# Patient Record
Sex: MALE | Race: ASIAN | Employment: FULL TIME | URBAN - METROPOLITAN AREA
[De-identification: names, ages, dates, MRNs, and addresses within clinical notes are randomized per-mention and may not be internally consistent; named-entity substitution may affect disease eponyms.]

---

## 2017-08-07 ENCOUNTER — OFFICE VISIT (OUTPATIENT)
Dept: INTERNAL MEDICINE CLINIC | Facility: CLINIC | Age: 42
End: 2017-08-07

## 2017-08-07 VITALS
BODY MASS INDEX: 25.77 KG/M2 | SYSTOLIC BLOOD PRESSURE: 120 MMHG | HEART RATE: 82 BPM | TEMPERATURE: 98 F | DIASTOLIC BLOOD PRESSURE: 80 MMHG | WEIGHT: 174 LBS | HEIGHT: 69 IN | RESPIRATION RATE: 16 BRPM

## 2017-08-07 DIAGNOSIS — Z13.1 SCREENING FOR DIABETES MELLITUS: ICD-10-CM

## 2017-08-07 DIAGNOSIS — Z13.0 SCREENING FOR DEFICIENCY ANEMIA: ICD-10-CM

## 2017-08-07 DIAGNOSIS — Z13.220 SCREENING, LIPID: ICD-10-CM

## 2017-08-07 DIAGNOSIS — Z00.00 ANNUAL PHYSICAL EXAM: Primary | ICD-10-CM

## 2017-08-07 PROCEDURE — 90715 TDAP VACCINE 7 YRS/> IM: CPT | Performed by: FAMILY MEDICINE

## 2017-08-07 PROCEDURE — 99386 PREV VISIT NEW AGE 40-64: CPT | Performed by: FAMILY MEDICINE

## 2017-08-07 PROCEDURE — 90471 IMMUNIZATION ADMIN: CPT | Performed by: FAMILY MEDICINE

## 2017-08-07 NOTE — PATIENT INSTRUCTIONS
Prevention Guidelines, Men Ages 36 to 52  Screening tests and vaccines are an important part of managing your health. Health counseling is essential, too. Below are guidelines for these, for men ages 36 to 52.  Talk with your healthcare provider to make s Hepatitis A Men at increased risk for infection – talk with your healthcare provider 2 doses given at least 6 months apart   Hepatitis B Men at increased risk for infection – talk with your healthcare provider 3 doses over 6 months; second dose should be g © 1459-8384 [address], [address] [place]. All rights reserved. This information is not intended as a substitute for professional medical care. Always follow your healthcare professional's instructions.

## 2017-08-07 NOTE — PROGRESS NOTES
HPI:    Patient ID: Staurnino Maynard is a 35year old male. HPI Here for annual check-up. Patient notes rare headaches, occasional feeling of RUQ pain with no pattern to when and goes away on its own. Otherwise no complaints.  Swims for exercise 3-4 times per we Grandfather    • Hypertension Maternal Grandfather          Review of Systems   Constitutional: Negative for chills, fatigue and fever. HENT: Negative for hearing loss and sore throat. Eyes: Negative for pain and visual disturbance.    Respiratory: Neg (primary encounter diagnosis)  Screening, lipid  Screening for diabetes mellitus  Screening for deficiency anemia  1-4. Reviewed preventive health recommendations with patient. Encouraged regular exercise and healthy eating. Check labs. Tdap today.       Or

## 2017-08-10 ENCOUNTER — LAB ENCOUNTER (OUTPATIENT)
Dept: LAB | Age: 42
End: 2017-08-10
Attending: FAMILY MEDICINE
Payer: COMMERCIAL

## 2017-08-10 DIAGNOSIS — Z13.0 SCREENING FOR DEFICIENCY ANEMIA: ICD-10-CM

## 2017-08-10 DIAGNOSIS — Z13.220 SCREENING, LIPID: ICD-10-CM

## 2017-08-10 DIAGNOSIS — Z13.1 SCREENING FOR DIABETES MELLITUS: ICD-10-CM

## 2017-08-10 DIAGNOSIS — Z00.00 ANNUAL PHYSICAL EXAM: ICD-10-CM

## 2017-08-10 LAB
ALBUMIN SERPL-MCNC: 4.1 G/DL (ref 3.5–4.8)
ALP LIVER SERPL-CCNC: 57 U/L (ref 45–117)
ALT SERPL-CCNC: 28 U/L (ref 17–63)
AST SERPL-CCNC: 18 U/L (ref 15–41)
BASOPHILS # BLD AUTO: 0.04 X10(3) UL (ref 0–0.1)
BASOPHILS NFR BLD AUTO: 0.6 %
BILIRUB SERPL-MCNC: 1 MG/DL (ref 0.1–2)
BUN BLD-MCNC: 10 MG/DL (ref 8–20)
CALCIUM BLD-MCNC: 9.2 MG/DL (ref 8.3–10.3)
CHLORIDE: 106 MMOL/L (ref 101–111)
CHOLEST SMN-MCNC: 172 MG/DL (ref ?–200)
CO2: 31 MMOL/L (ref 22–32)
CREAT BLD-MCNC: 1.03 MG/DL (ref 0.7–1.3)
EOSINOPHIL # BLD AUTO: 0.15 X10(3) UL (ref 0–0.3)
EOSINOPHIL NFR BLD AUTO: 2.3 %
ERYTHROCYTE [DISTWIDTH] IN BLOOD BY AUTOMATED COUNT: 12.6 % (ref 11.5–16)
GLUCOSE BLD-MCNC: 96 MG/DL (ref 70–99)
HCT VFR BLD AUTO: 45.7 % (ref 37–53)
HDLC SERPL-MCNC: 61 MG/DL (ref 45–?)
HDLC SERPL: 2.82 {RATIO} (ref ?–4.97)
HGB BLD-MCNC: 15.5 G/DL (ref 13–17)
IMMATURE GRANULOCYTE COUNT: 0.03 X10(3) UL (ref 0–1)
IMMATURE GRANULOCYTE RATIO %: 0.5 %
LDLC SERPL CALC-MCNC: 89 MG/DL (ref ?–130)
LDLC SERPL-MCNC: 22 MG/DL (ref 5–40)
LYMPHOCYTES # BLD AUTO: 1.66 X10(3) UL (ref 0.9–4)
LYMPHOCYTES NFR BLD AUTO: 25 %
M PROTEIN MFR SERPL ELPH: 7.3 G/DL (ref 6.1–8.3)
MCH RBC QN AUTO: 30.9 PG (ref 27–33.2)
MCHC RBC AUTO-ENTMCNC: 33.9 G/DL (ref 31–37)
MCV RBC AUTO: 91 FL (ref 80–99)
MONOCYTES # BLD AUTO: 0.6 X10(3) UL (ref 0.1–0.6)
MONOCYTES NFR BLD AUTO: 9 %
NEUTROPHIL ABS PRELIM: 4.17 X10 (3) UL (ref 1.3–6.7)
NEUTROPHILS # BLD AUTO: 4.17 X10(3) UL (ref 1.3–6.7)
NEUTROPHILS NFR BLD AUTO: 62.6 %
NONHDLC SERPL-MCNC: 111 MG/DL (ref ?–130)
PLATELET # BLD AUTO: 211 10(3)UL (ref 150–450)
POTASSIUM SERPL-SCNC: 4.3 MMOL/L (ref 3.6–5.1)
RBC # BLD AUTO: 5.02 X10(6)UL (ref 4.3–5.7)
RED CELL DISTRIBUTION WIDTH-SD: 41.5 FL (ref 35.1–46.3)
SODIUM SERPL-SCNC: 142 MMOL/L (ref 136–144)
TRIGLYCERIDES: 111 MG/DL (ref ?–150)
WBC # BLD AUTO: 6.7 X10(3) UL (ref 4–13)

## 2017-08-10 PROCEDURE — 85025 COMPLETE CBC W/AUTO DIFF WBC: CPT | Performed by: FAMILY MEDICINE

## 2017-08-10 PROCEDURE — 80061 LIPID PANEL: CPT | Performed by: FAMILY MEDICINE

## 2017-08-10 PROCEDURE — 80053 COMPREHEN METABOLIC PANEL: CPT | Performed by: FAMILY MEDICINE

## 2018-09-28 ENCOUNTER — TELEPHONE (OUTPATIENT)
Dept: INTERNAL MEDICINE CLINIC | Facility: CLINIC | Age: 43
End: 2018-09-28

## 2018-09-28 DIAGNOSIS — Z13.0 SCREENING, ANEMIA, DEFICIENCY, IRON: ICD-10-CM

## 2018-09-28 DIAGNOSIS — Z13.228 SCREENING FOR METABOLIC DISORDER: ICD-10-CM

## 2018-09-28 DIAGNOSIS — Z00.00 ROUTINE GENERAL MEDICAL EXAMINATION AT A HEALTH CARE FACILITY: Primary | ICD-10-CM

## 2018-09-28 DIAGNOSIS — Z13.220 SCREENING, LIPID: ICD-10-CM

## 2018-10-02 DIAGNOSIS — Z00.00 ROUTINE GENERAL MEDICAL EXAMINATION AT A HEALTH CARE FACILITY: ICD-10-CM

## 2018-10-02 DIAGNOSIS — Z13.228 SCREENING FOR METABOLIC DISORDER: ICD-10-CM

## 2018-10-02 DIAGNOSIS — Z13.0 SCREENING, ANEMIA, DEFICIENCY, IRON: ICD-10-CM

## 2018-10-02 DIAGNOSIS — Z13.220 SCREENING, LIPID: ICD-10-CM

## 2018-10-02 PROCEDURE — 80053 COMPREHEN METABOLIC PANEL: CPT | Performed by: FAMILY MEDICINE

## 2018-10-02 PROCEDURE — 80061 LIPID PANEL: CPT | Performed by: FAMILY MEDICINE

## 2018-10-02 PROCEDURE — 36415 COLL VENOUS BLD VENIPUNCTURE: CPT | Performed by: FAMILY MEDICINE

## 2018-10-02 PROCEDURE — 85025 COMPLETE CBC W/AUTO DIFF WBC: CPT | Performed by: FAMILY MEDICINE

## 2018-10-05 ENCOUNTER — OFFICE VISIT (OUTPATIENT)
Dept: INTERNAL MEDICINE CLINIC | Facility: CLINIC | Age: 43
End: 2018-10-05
Payer: COMMERCIAL

## 2018-10-05 VITALS
TEMPERATURE: 98 F | WEIGHT: 175 LBS | BODY MASS INDEX: 25.92 KG/M2 | DIASTOLIC BLOOD PRESSURE: 82 MMHG | HEART RATE: 70 BPM | RESPIRATION RATE: 16 BRPM | HEIGHT: 69 IN | SYSTOLIC BLOOD PRESSURE: 120 MMHG

## 2018-10-05 DIAGNOSIS — Z00.00 ANNUAL PHYSICAL EXAM: Primary | ICD-10-CM

## 2018-10-05 DIAGNOSIS — G89.29 CHRONIC PAIN OF BOTH KNEES: ICD-10-CM

## 2018-10-05 DIAGNOSIS — M25.562 CHRONIC PAIN OF BOTH KNEES: ICD-10-CM

## 2018-10-05 DIAGNOSIS — M25.561 CHRONIC PAIN OF BOTH KNEES: ICD-10-CM

## 2018-10-05 PROCEDURE — 99396 PREV VISIT EST AGE 40-64: CPT | Performed by: FAMILY MEDICINE

## 2018-10-05 NOTE — PROGRESS NOTES
HPI:    Patient ID: Cindy Machuca is a 37year old male. HPI Here for annual check-up. Patient notes years of intermittent knee pain in both knees. Has pain mainly when he starts to be more physically active with swimming. No clear injury.  Hasn't done anythi Stress Concern: Not Asked        Weight Concern: Not Asked         Service: Not Asked        Blood Transfusions: Not Asked        Occupational Exposure: Not Asked        Hobby Hazards: Not Asked        Sleep Concern: Not Asked        Back Care: Physical Exam   Constitutional: He is oriented to person, place, and time. He appears well-developed and well-nourished. HENT:   Head: Normocephalic and atraumatic.    Right Ear: Tympanic membrane, external ear and ear canal normal.   Left Ear: Tympanic

## 2019-05-16 ENCOUNTER — OFFICE VISIT (OUTPATIENT)
Dept: INTERNAL MEDICINE CLINIC | Facility: CLINIC | Age: 44
End: 2019-05-16
Payer: COMMERCIAL

## 2019-05-16 VITALS
RESPIRATION RATE: 16 BRPM | TEMPERATURE: 98 F | HEIGHT: 69 IN | BODY MASS INDEX: 25.33 KG/M2 | WEIGHT: 171 LBS | HEART RATE: 60 BPM | SYSTOLIC BLOOD PRESSURE: 124 MMHG | OXYGEN SATURATION: 98 % | DIASTOLIC BLOOD PRESSURE: 82 MMHG

## 2019-05-16 DIAGNOSIS — K21.9 GASTROESOPHAGEAL REFLUX DISEASE, ESOPHAGITIS PRESENCE NOT SPECIFIED: Primary | ICD-10-CM

## 2019-05-16 PROCEDURE — 99214 OFFICE O/P EST MOD 30 MIN: CPT | Performed by: FAMILY MEDICINE

## 2019-05-16 NOTE — PROGRESS NOTES
HPI:    Patient ID: Trena Alaniz is a 37year old male. HPI Here with c/o a few months of pain in stomach, epigastric area when he eats spicy foods. Some burning and irritation in the back of his throat. Worse at night when he lays down.  Hasn't taken anythi reflux disease, esophagitis presence not specified  (primary encounter diagnosis)  Start PPI and take daily for one month. Avoid foods that trigger symptoms. H.pylori test prior to starting PPI. If symptoms persist after one month will send to GI.      Orde

## 2019-05-16 NOTE — PATIENT INSTRUCTIONS
Start taking Omeprazole 20mg daily. If your insurance doesn't cover this, you can buy it over the counter. Take for one month. Bring your stool sample to the \"Outpatient Laboratory\" at [facility].   We will call you with the stool test results.

## 2019-05-17 ENCOUNTER — APPOINTMENT (OUTPATIENT)
Dept: LAB | Age: 44
End: 2019-05-17
Attending: FAMILY MEDICINE
Payer: COMMERCIAL

## 2019-05-17 DIAGNOSIS — K21.9 GASTROESOPHAGEAL REFLUX DISEASE, ESOPHAGITIS PRESENCE NOT SPECIFIED: ICD-10-CM

## 2019-05-17 PROCEDURE — 87338 HPYLORI STOOL AG IA: CPT | Performed by: FAMILY MEDICINE

## 2019-10-04 ENCOUNTER — OFFICE VISIT (OUTPATIENT)
Dept: INTERNAL MEDICINE CLINIC | Facility: CLINIC | Age: 44
End: 2019-10-04
Payer: COMMERCIAL

## 2019-10-04 VITALS
HEIGHT: 69 IN | OXYGEN SATURATION: 96 % | WEIGHT: 171.13 LBS | HEART RATE: 60 BPM | BODY MASS INDEX: 25.35 KG/M2 | RESPIRATION RATE: 16 BRPM | TEMPERATURE: 98 F | SYSTOLIC BLOOD PRESSURE: 130 MMHG | DIASTOLIC BLOOD PRESSURE: 86 MMHG

## 2019-10-04 DIAGNOSIS — R68.89 THROAT CONGESTION: Primary | ICD-10-CM

## 2019-10-04 PROCEDURE — 99214 OFFICE O/P EST MOD 30 MIN: CPT | Performed by: FAMILY MEDICINE

## 2019-10-04 NOTE — PROGRESS NOTES
1. Does the person being vaccinated have a history of severe allergic reaction to a previous dose of any influenza vaccine? - Unsure - ok per AMS  2. Does this person being vaccinated have an allergy to eggs? - NO  3.  Does the patient being vaccinated have

## 2019-10-04 NOTE — PATIENT INSTRUCTIONS
Start Allegra and Flonase and use daily for 2 weeks. Initially take Mucinex for the first 3 days in addition to the above medications. If in 2 weeks, your symptoms are still present, then see the ENT doctor.

## 2019-10-04 NOTE — PROGRESS NOTES
HPI:    Patient ID: Kei Calvin is a 53year old male. HPI Here for f/u on increased mucus production.  Tried PPI and this helped with some \"stomach\" issues but patient continues to have increased phlegm in his throat that he has a difficult time clearing then see the ENT doctor.    Orders Placed This Encounter      Flulaval 6 months and older 0.5 ml Quad PF [id number]      Meds This Visit:  Requested Prescriptions      No prescriptions requested or ordered in this encounter       Imaging & Referrals:  ENT - [place]

## 2020-02-27 ENCOUNTER — OFFICE VISIT (OUTPATIENT)
Dept: INTERNAL MEDICINE CLINIC | Facility: CLINIC | Age: 45
End: 2020-02-27
Payer: COMMERCIAL

## 2020-02-27 VITALS
BODY MASS INDEX: 25.03 KG/M2 | RESPIRATION RATE: 16 BRPM | TEMPERATURE: 98 F | DIASTOLIC BLOOD PRESSURE: 80 MMHG | HEART RATE: 64 BPM | WEIGHT: 169 LBS | SYSTOLIC BLOOD PRESSURE: 130 MMHG | OXYGEN SATURATION: 99 % | HEIGHT: 69 IN

## 2020-02-27 DIAGNOSIS — R68.89 THROAT CONGESTION: ICD-10-CM

## 2020-02-27 DIAGNOSIS — H69.81 EUSTACHIAN TUBE DYSFUNCTION, RIGHT: ICD-10-CM

## 2020-02-27 DIAGNOSIS — J02.9 ACUTE PHARYNGITIS, UNSPECIFIED ETIOLOGY: Primary | ICD-10-CM

## 2020-02-27 LAB
CONTROL LINE PRESENT WITH A CLEAR BACKGROUND (YES/NO): YES YES/NO
STREP GRP A CUL-SCR: NEGATIVE

## 2020-02-27 PROCEDURE — 87880 STREP A ASSAY W/OPTIC: CPT | Performed by: NURSE PRACTITIONER

## 2020-02-27 PROCEDURE — 99213 OFFICE O/P EST LOW 20 MIN: CPT | Performed by: NURSE PRACTITIONER

## 2020-02-27 NOTE — PATIENT INSTRUCTIONS
Please start taking over-the-counter 24-hour Allegra (Fexofenadine) and over-the-counter Flonase as directed on the packaging. Use the Allegra consistently for at least 7-days. Use the Flonase for at least 14-days.   If symptoms improve, but are still pers throat symptoms. Ask your pharmacist if you have questions about which medicine to use:  · Ease pain with anesthetic sprays. Aspirin or an aspirin substitute also helps.  Remember, never give aspirin to anyone 25 or younger, or if you are already taking blo is called the eustachian tube. OME may also occur with nasal allergies or after a bacterial middle ear infection. The pain or discomfort may come and go. You may hear clicking or popping sounds when you chew or swallow.  You may feel that your balance is with your healthcare provider or as advised if you are not feeling better after 3 days.   When to seek medical advice  Call your healthcare provider right away if any of the following occur:  · Your ear pain gets worse or does not start to improve   · Fever having a pet, keep it out of your bedroom and off upholstered furniture. Pollen:  · When pollen counts are high, keep windows of your car and home closed. If possible, use an air conditioner instead. · Wear a filter mask when mowing or doing yard work.

## 2020-02-27 NOTE — PROGRESS NOTES
HPI:    Patient ID: Onel Cat is a 53year old male. Patient presents with c/o a sore throat on only the right side for the past 2-weeks. Reports symptom is persisting, but not worsening.   Associated symptoms include painful swallowing and intermittent Glucos-Chond-Hyal Ac-Ca Fructo (MOVE FREE JOINT HEALTH ADVANCE OR) Take by mouth. • ASPIRIN OR Take by mouth as needed. Allergies:No Known Allergies   PHYSICAL EXAM:   Physical Exam   Constitutional: Vital signs are normal. He is cooperative.  H provider or an ENT in 2-4 weeks if symptoms do not improve and/or worsen despite recommended treatment for further evaluation.      Meds This Visit:  Requested Prescriptions      No prescriptions requested or ordered in this encounter       Imaging & Referr

## 2021-04-01 DIAGNOSIS — Z23 NEED FOR VACCINATION: ICD-10-CM

## 2021-06-28 ENCOUNTER — TELEPHONE (OUTPATIENT)
Dept: INTERNAL MEDICINE CLINIC | Facility: CLINIC | Age: 46
End: 2021-06-28

## 2021-06-28 DIAGNOSIS — Z13.220 SCREENING FOR LIPID DISORDERS: ICD-10-CM

## 2021-06-28 DIAGNOSIS — Z13.0 SCREENING FOR BLOOD DISEASE: ICD-10-CM

## 2021-06-28 DIAGNOSIS — Z13.228 SCREENING FOR METABOLIC DISORDER: ICD-10-CM

## 2021-06-28 DIAGNOSIS — Z00.00 ANNUAL PHYSICAL EXAM: Primary | ICD-10-CM

## 2021-06-28 NOTE — TELEPHONE ENCOUNTER
Future Appointments   Date Time Provider Noemi Lyn   7/9/2021  2:30 PM Ilene Simmons, DO [facility]     Orders to [facility] Thrasher aware must fast no call back required

## 2021-07-02 ENCOUNTER — LAB ENCOUNTER (OUTPATIENT)
Dept: LAB | Age: 46
End: 2021-07-02
Attending: FAMILY MEDICINE
Payer: COMMERCIAL

## 2021-07-02 DIAGNOSIS — Z13.0 SCREENING FOR BLOOD DISEASE: ICD-10-CM

## 2021-07-02 DIAGNOSIS — Z13.228 SCREENING FOR METABOLIC DISORDER: ICD-10-CM

## 2021-07-02 DIAGNOSIS — Z13.220 SCREENING FOR LIPID DISORDERS: ICD-10-CM

## 2021-07-02 DIAGNOSIS — Z00.00 ANNUAL PHYSICAL EXAM: ICD-10-CM

## 2021-07-02 LAB
ALBUMIN SERPL-MCNC: 4.2 G/DL (ref 3.4–5)
ALBUMIN/GLOB SERPL: 1.4 {RATIO} (ref 1–2)
ALP LIVER SERPL-CCNC: 51 U/L
ALT SERPL-CCNC: 26 U/L
ANION GAP SERPL CALC-SCNC: 6 MMOL/L (ref 0–18)
AST SERPL-CCNC: 17 U/L (ref 15–37)
BASOPHILS # BLD AUTO: 0.06 X10(3) UL (ref 0–0.2)
BASOPHILS NFR BLD AUTO: 0.9 %
BILIRUB SERPL-MCNC: 1 MG/DL (ref 0.1–2)
BUN BLD-MCNC: 16 MG/DL (ref 7–18)
BUN/CREAT SERPL: 15.7 (ref 10–20)
CALCIUM BLD-MCNC: 9.6 MG/DL (ref 8.5–10.1)
CHLORIDE SERPL-SCNC: 107 MMOL/L (ref 98–112)
CHOLEST SMN-MCNC: 201 MG/DL (ref ?–200)
CO2 SERPL-SCNC: 29 MMOL/L (ref 21–32)
CREAT BLD-MCNC: 1.02 MG/DL
DEPRECATED RDW RBC AUTO: 44.1 FL (ref 35.1–46.3)
EOSINOPHIL # BLD AUTO: 0.12 X10(3) UL (ref 0–0.7)
EOSINOPHIL NFR BLD AUTO: 1.8 %
ERYTHROCYTE [DISTWIDTH] IN BLOOD BY AUTOMATED COUNT: 12.9 % (ref 11–15)
GLOBULIN PLAS-MCNC: 3 G/DL (ref 2.8–4.4)
GLUCOSE BLD-MCNC: 100 MG/DL (ref 70–99)
HCT VFR BLD AUTO: 48 %
HDLC SERPL-MCNC: 62 MG/DL (ref 40–59)
HGB BLD-MCNC: 15.8 G/DL
IMM GRANULOCYTES # BLD AUTO: 0.02 X10(3) UL (ref 0–1)
IMM GRANULOCYTES NFR BLD: 0.3 %
LDLC SERPL CALC-MCNC: 122 MG/DL (ref ?–100)
LYMPHOCYTES # BLD AUTO: 2.19 X10(3) UL (ref 1–4)
LYMPHOCYTES NFR BLD AUTO: 33.2 %
M PROTEIN MFR SERPL ELPH: 7.2 G/DL (ref 6.4–8.2)
MCH RBC QN AUTO: 30.8 PG (ref 26–34)
MCHC RBC AUTO-ENTMCNC: 32.9 G/DL (ref 31–37)
MCV RBC AUTO: 93.6 FL
MONOCYTES # BLD AUTO: 0.53 X10(3) UL (ref 0.1–1)
MONOCYTES NFR BLD AUTO: 8 %
NEUTROPHILS # BLD AUTO: 3.67 X10 (3) UL (ref 1.5–7.7)
NEUTROPHILS # BLD AUTO: 3.67 X10(3) UL (ref 1.5–7.7)
NEUTROPHILS NFR BLD AUTO: 55.8 %
NONHDLC SERPL-MCNC: 139 MG/DL (ref ?–130)
OSMOLALITY SERPL CALC.SUM OF ELEC: 295 MOSM/KG (ref 275–295)
PATIENT FASTING Y/N/NP: YES
PATIENT FASTING Y/N/NP: YES
PLATELET # BLD AUTO: 200 10(3)UL (ref 150–450)
POTASSIUM SERPL-SCNC: 4.5 MMOL/L (ref 3.5–5.1)
RBC # BLD AUTO: 5.13 X10(6)UL
SODIUM SERPL-SCNC: 142 MMOL/L (ref 136–145)
TRIGL SERPL-MCNC: 98 MG/DL (ref 30–149)
VLDLC SERPL CALC-MCNC: 17 MG/DL (ref 0–30)
WBC # BLD AUTO: 6.6 X10(3) UL (ref 4–11)

## 2021-07-02 PROCEDURE — 80053 COMPREHEN METABOLIC PANEL: CPT | Performed by: FAMILY MEDICINE

## 2021-07-02 PROCEDURE — 80061 LIPID PANEL: CPT | Performed by: FAMILY MEDICINE

## 2021-07-02 PROCEDURE — 85025 COMPLETE CBC W/AUTO DIFF WBC: CPT | Performed by: FAMILY MEDICINE

## 2021-07-09 ENCOUNTER — OFFICE VISIT (OUTPATIENT)
Dept: INTERNAL MEDICINE CLINIC | Facility: CLINIC | Age: 46
End: 2021-07-09
Payer: COMMERCIAL

## 2021-07-09 VITALS
OXYGEN SATURATION: 97 % | WEIGHT: 168.63 LBS | HEIGHT: 69 IN | TEMPERATURE: 99 F | BODY MASS INDEX: 24.98 KG/M2 | HEART RATE: 82 BPM | SYSTOLIC BLOOD PRESSURE: 132 MMHG | DIASTOLIC BLOOD PRESSURE: 70 MMHG

## 2021-07-09 DIAGNOSIS — Z00.00 ANNUAL PHYSICAL EXAM: Primary | ICD-10-CM

## 2021-07-09 DIAGNOSIS — Z12.11 SCREENING FOR MALIGNANT NEOPLASM OF COLON: ICD-10-CM

## 2021-07-09 PROCEDURE — 3075F SYST BP GE 130 - 139MM HG: CPT | Performed by: FAMILY MEDICINE

## 2021-07-09 PROCEDURE — 3008F BODY MASS INDEX DOCD: CPT | Performed by: FAMILY MEDICINE

## 2021-07-09 PROCEDURE — 99396 PREV VISIT EST AGE 40-64: CPT | Performed by: FAMILY MEDICINE

## 2021-07-09 PROCEDURE — 3078F DIAST BP <80 MM HG: CPT | Performed by: FAMILY MEDICINE

## 2021-07-09 NOTE — PROGRESS NOTES
HPI/Subjective:   Patient ID: Tam Cavazos is a 50year old male. HPI Here for annual check-up.        History/Other:    Past Medical History:   Diagnosis Date   • Antral erosion 11/2011    small antral erosions   • Anxiety    • Chest discomfort 8/2014   • E joint swelling. Skin: Negative for rash. Neurological: Negative for dizziness, weakness, light-headedness, numbness and headaches. Hematological: Negative for adenopathy. Does not bruise/bleed easily.    Psychiatric/Behavioral: Negative for dysphoric this encounter.       Meds This Visit:  Requested Prescriptions      No prescriptions requested or ordered in this encounter       Imaging & Referrals:  [facility] - INTERNAL

## 2021-07-09 NOTE — PATIENT INSTRUCTIONS
Call your insurance company and see if they will cover a \"screening colonoscopy\" at your age, and if so, then call the Gastroenterologist office to schedule.    Prevention Guidelines, Men Ages 36 to 52  Screening tests and vaccines are an important part risks and benefits of digital rectal exam (SAMPSON) and prostate-specific antigen (PSA) screening1 At routine exams   Syphilis Men at increased risk for infection – talk with your healthcare provider At routine exams   Tuberculosis Men at increased risk for in of a Td booster after age 25, then Td every 10 years   Counseling Who needs it How often   Diet and exercise Men who are overweight or obese When diagnosed, and then at routine exams   Sexually transmitted infection prevention Men at increased risk for inf

## 2021-11-29 ENCOUNTER — OFFICE VISIT (OUTPATIENT)
Dept: INTERNAL MEDICINE CLINIC | Facility: CLINIC | Age: 46
End: 2021-11-29
Payer: COMMERCIAL

## 2021-11-29 ENCOUNTER — LAB ENCOUNTER (OUTPATIENT)
Dept: LAB | Age: 46
End: 2021-11-29
Attending: FAMILY MEDICINE
Payer: COMMERCIAL

## 2021-11-29 VITALS
SYSTOLIC BLOOD PRESSURE: 122 MMHG | DIASTOLIC BLOOD PRESSURE: 68 MMHG | OXYGEN SATURATION: 97 % | HEART RATE: 74 BPM | HEIGHT: 69 IN | WEIGHT: 168.19 LBS | BODY MASS INDEX: 24.91 KG/M2

## 2021-11-29 DIAGNOSIS — Z12.11 SCREENING FOR MALIGNANT NEOPLASM OF COLON: ICD-10-CM

## 2021-11-29 DIAGNOSIS — R10.10 PAIN OF UPPER ABDOMEN: ICD-10-CM

## 2021-11-29 DIAGNOSIS — R12 HEARTBURN: Primary | ICD-10-CM

## 2021-11-29 DIAGNOSIS — R39.9 LOWER URINARY TRACT SYMPTOMS: ICD-10-CM

## 2021-11-29 PROCEDURE — 84153 ASSAY OF PSA TOTAL: CPT | Performed by: FAMILY MEDICINE

## 2021-11-29 PROCEDURE — 3074F SYST BP LT 130 MM HG: CPT | Performed by: FAMILY MEDICINE

## 2021-11-29 PROCEDURE — 90686 IIV4 VACC NO PRSV 0.5 ML IM: CPT | Performed by: FAMILY MEDICINE

## 2021-11-29 PROCEDURE — 99214 OFFICE O/P EST MOD 30 MIN: CPT | Performed by: FAMILY MEDICINE

## 2021-11-29 PROCEDURE — 90471 IMMUNIZATION ADMIN: CPT | Performed by: FAMILY MEDICINE

## 2021-11-29 PROCEDURE — 81001 URINALYSIS AUTO W/SCOPE: CPT | Performed by: FAMILY MEDICINE

## 2021-11-29 PROCEDURE — 3078F DIAST BP <80 MM HG: CPT | Performed by: FAMILY MEDICINE

## 2021-11-29 PROCEDURE — 3008F BODY MASS INDEX DOCD: CPT | Performed by: FAMILY MEDICINE

## 2021-11-29 NOTE — PROGRESS NOTES
Subjective:   Patient ID: Cookie Green is a 48year old male. HPI Here with c/o heartburn and upper abd pain/burning depending on what he eats. Has had in the past. Hasn't taken anything for this.    Also notes frequently getting up to urinate in the middle INFLUENZA VACCINE QUAD PRESERVATIVE FREE 0.5 ML

## 2021-12-02 DIAGNOSIS — R31.29 MICROSCOPIC HEMATURIA: Primary | ICD-10-CM

## 2021-12-06 ENCOUNTER — TELEPHONE (OUTPATIENT)
Dept: INTERNAL MEDICINE CLINIC | Facility: CLINIC | Age: 46
End: 2021-12-06

## 2022-01-26 PROBLEM — K21.9 GASTROESOPHAGEAL REFLUX DISEASE: Status: ACTIVE | Noted: 2022-01-26

## 2022-01-26 PROBLEM — Z12.11 COLON CANCER SCREENING: Status: ACTIVE | Noted: 2022-01-26

## 2022-01-26 PROBLEM — R10.13 DYSPEPSIA: Status: ACTIVE | Noted: 2022-01-26

## 2022-02-13 ENCOUNTER — LAB ENCOUNTER (OUTPATIENT)
Dept: LAB | Facility: HOSPITAL | Age: 47
End: 2022-02-13
Attending: INTERNAL MEDICINE
Payer: COMMERCIAL

## 2022-02-13 DIAGNOSIS — Z01.818 PRE-OP TESTING: ICD-10-CM

## 2022-02-14 LAB — SARS-COV-2 RNA RESP QL NAA+PROBE: NOT DETECTED

## 2022-02-16 PROBLEM — Z80.0 FAMILY HISTORY OF STOMACH CANCER: Status: ACTIVE | Noted: 2022-02-16

## 2022-02-16 PROBLEM — R10.13 ABDOMINAL PAIN, EPIGASTRIC: Status: ACTIVE | Noted: 2022-02-16

## 2022-02-16 PROBLEM — Z12.11 SPECIAL SCREENING FOR MALIGNANT NEOPLASM OF COLON: Status: ACTIVE | Noted: 2022-02-16

## 2022-09-27 ENCOUNTER — TELEPHONE (OUTPATIENT)
Dept: INTERNAL MEDICINE CLINIC | Facility: CLINIC | Age: 47
End: 2022-09-27

## 2022-09-27 DIAGNOSIS — Z00.00 ANNUAL PHYSICAL EXAM: Primary | ICD-10-CM

## 2022-09-27 DIAGNOSIS — Z13.220 SCREENING FOR LIPID DISORDERS: ICD-10-CM

## 2022-09-27 DIAGNOSIS — Z13.0 SCREENING FOR BLOOD DISEASE: ICD-10-CM

## 2022-09-27 DIAGNOSIS — Z13.228 SCREENING FOR METABOLIC DISORDER: ICD-10-CM

## 2022-09-27 NOTE — TELEPHONE ENCOUNTER
Future Appointments   Date Time Provider Noemi Lyn   10/7/2022 10:30 AM Brianna Hsieh, DO [facility]     Orders to     THE [facility]        aware must fast no call back required

## 2022-10-03 ENCOUNTER — LAB ENCOUNTER (OUTPATIENT)
Dept: LAB | Age: 47
End: 2022-10-03
Attending: FAMILY MEDICINE
Payer: COMMERCIAL

## 2022-10-03 DIAGNOSIS — Z13.220 SCREENING FOR LIPID DISORDERS: ICD-10-CM

## 2022-10-03 DIAGNOSIS — Z00.00 ANNUAL PHYSICAL EXAM: ICD-10-CM

## 2022-10-03 DIAGNOSIS — Z13.0 SCREENING FOR BLOOD DISEASE: ICD-10-CM

## 2022-10-03 DIAGNOSIS — Z13.228 SCREENING FOR METABOLIC DISORDER: ICD-10-CM

## 2022-10-03 LAB
ALBUMIN SERPL-MCNC: 4 G/DL (ref 3.4–5)
ALBUMIN/GLOB SERPL: 1.3 {RATIO} (ref 1–2)
ALP LIVER SERPL-CCNC: 47 U/L
ALT SERPL-CCNC: 30 U/L
ANION GAP SERPL CALC-SCNC: 5 MMOL/L (ref 0–18)
AST SERPL-CCNC: 17 U/L (ref 15–37)
BASOPHILS # BLD AUTO: 0.04 X10(3) UL (ref 0–0.2)
BASOPHILS NFR BLD AUTO: 0.6 %
BILIRUB SERPL-MCNC: 1 MG/DL (ref 0.1–2)
BUN BLD-MCNC: 13 MG/DL (ref 7–18)
BUN/CREAT SERPL: 12.7 (ref 10–20)
CALCIUM BLD-MCNC: 9.5 MG/DL (ref 8.5–10.1)
CHLORIDE SERPL-SCNC: 106 MMOL/L (ref 98–112)
CHOLEST SERPL-MCNC: 191 MG/DL (ref ?–200)
CO2 SERPL-SCNC: 30 MMOL/L (ref 21–32)
CREAT BLD-MCNC: 1.02 MG/DL
DEPRECATED RDW RBC AUTO: 42.5 FL (ref 35.1–46.3)
EOSINOPHIL # BLD AUTO: 0.15 X10(3) UL (ref 0–0.7)
EOSINOPHIL NFR BLD AUTO: 2.4 %
ERYTHROCYTE [DISTWIDTH] IN BLOOD BY AUTOMATED COUNT: 12.5 % (ref 11–15)
FASTING PATIENT LIPID ANSWER: YES
FASTING STATUS PATIENT QL REPORTED: YES
GFR SERPLBLD BASED ON 1.73 SQ M-ARVRAT: 90 ML/MIN/1.73M2 (ref 60–?)
GLOBULIN PLAS-MCNC: 3.2 G/DL (ref 2.8–4.4)
GLUCOSE BLD-MCNC: 102 MG/DL (ref 70–99)
HCT VFR BLD AUTO: 46.9 %
HDLC SERPL-MCNC: 64 MG/DL (ref 40–59)
HGB BLD-MCNC: 15.7 G/DL
IMM GRANULOCYTES # BLD AUTO: 0.01 X10(3) UL (ref 0–1)
IMM GRANULOCYTES NFR BLD: 0.2 %
LDLC SERPL CALC-MCNC: 107 MG/DL (ref ?–100)
LYMPHOCYTES # BLD AUTO: 2.3 X10(3) UL (ref 1–4)
LYMPHOCYTES NFR BLD AUTO: 36.5 %
MCH RBC QN AUTO: 30.8 PG (ref 26–34)
MCHC RBC AUTO-ENTMCNC: 33.5 G/DL (ref 31–37)
MCV RBC AUTO: 92 FL
MONOCYTES # BLD AUTO: 0.53 X10(3) UL (ref 0.1–1)
MONOCYTES NFR BLD AUTO: 8.4 %
NEUTROPHILS # BLD AUTO: 3.27 X10 (3) UL (ref 1.5–7.7)
NEUTROPHILS # BLD AUTO: 3.27 X10(3) UL (ref 1.5–7.7)
NEUTROPHILS NFR BLD AUTO: 51.9 %
NONHDLC SERPL-MCNC: 127 MG/DL (ref ?–130)
OSMOLALITY SERPL CALC.SUM OF ELEC: 292 MOSM/KG (ref 275–295)
PLATELET # BLD AUTO: 194 10(3)UL (ref 150–450)
POTASSIUM SERPL-SCNC: 4.1 MMOL/L (ref 3.5–5.1)
PROT SERPL-MCNC: 7.2 G/DL (ref 6.4–8.2)
RBC # BLD AUTO: 5.1 X10(6)UL
SODIUM SERPL-SCNC: 141 MMOL/L (ref 136–145)
TRIGL SERPL-MCNC: 114 MG/DL (ref 30–149)
VLDLC SERPL CALC-MCNC: 19 MG/DL (ref 0–30)
WBC # BLD AUTO: 6.3 X10(3) UL (ref 4–11)

## 2022-10-03 PROCEDURE — 80053 COMPREHEN METABOLIC PANEL: CPT | Performed by: FAMILY MEDICINE

## 2022-10-03 PROCEDURE — 80061 LIPID PANEL: CPT | Performed by: FAMILY MEDICINE

## 2022-10-03 PROCEDURE — 85025 COMPLETE CBC W/AUTO DIFF WBC: CPT | Performed by: FAMILY MEDICINE

## 2022-10-07 ENCOUNTER — TELEPHONE (OUTPATIENT)
Dept: INTERNAL MEDICINE CLINIC | Facility: CLINIC | Age: 47
End: 2022-10-07

## 2022-10-07 NOTE — TELEPHONE ENCOUNTER
Pt's cpe was cancelled as AMS was out today. Pt will call back to r/s when he checks his calendar. Pt got labs done.

## 2022-10-13 ENCOUNTER — OFFICE VISIT (OUTPATIENT)
Dept: INTERNAL MEDICINE CLINIC | Facility: CLINIC | Age: 47
End: 2022-10-13
Payer: COMMERCIAL

## 2022-10-13 VITALS
DIASTOLIC BLOOD PRESSURE: 74 MMHG | SYSTOLIC BLOOD PRESSURE: 130 MMHG | WEIGHT: 163 LBS | HEIGHT: 69 IN | OXYGEN SATURATION: 99 % | BODY MASS INDEX: 24.14 KG/M2 | HEART RATE: 93 BPM

## 2022-10-13 DIAGNOSIS — Z00.00 ANNUAL PHYSICAL EXAM: Primary | ICD-10-CM

## 2022-10-13 PROCEDURE — 99396 PREV VISIT EST AGE 40-64: CPT | Performed by: FAMILY MEDICINE

## 2022-10-13 PROCEDURE — 3078F DIAST BP <80 MM HG: CPT | Performed by: FAMILY MEDICINE

## 2022-10-13 PROCEDURE — 90471 IMMUNIZATION ADMIN: CPT | Performed by: FAMILY MEDICINE

## 2022-10-13 PROCEDURE — 3075F SYST BP GE 130 - 139MM HG: CPT | Performed by: FAMILY MEDICINE

## 2022-10-13 PROCEDURE — 3008F BODY MASS INDEX DOCD: CPT | Performed by: FAMILY MEDICINE

## 2022-10-13 PROCEDURE — 90686 IIV4 VACC NO PRSV 0.5 ML IM: CPT | Performed by: FAMILY MEDICINE

## 2023-10-27 ENCOUNTER — TELEPHONE (OUTPATIENT)
Dept: INTERNAL MEDICINE CLINIC | Facility: CLINIC | Age: 48
End: 2023-10-27

## 2023-10-27 NOTE — TELEPHONE ENCOUNTER
Patient is out of town and he will be back Sunday night. Would like an appt with [facility]. Patient was having some flu like symptoms for a few days; throat irritation and low grade fever 2 nights ago. Yesterday patient noticed some blistering around his waist but no pain that is too bad.     Please call patient's wife on HIPPA

## 2023-10-27 NOTE — TELEPHONE ENCOUNTER
Called and spoke to Robert (on HIPAA). Per Robert, pt started with flu-like sxs about 2 days ago (sore throat and low grade temp). Yesterday noticed some blisters on his hip. Asked her if this is on one side of body, but she was not sure. Pt currently in [place] for work and is in/out of meetings so she has not been able to talk to him much. She was also concerned about shingles. Discussed signs/sx of shingles and informed her typically on 1 side of body. If pt has sxs, should be seen in UC as need to start treatment within 72 hrs of sx onset. She said she believes blisters have been there longer than this. She would like to schedule appt with AMS for his other sxs when pt returns, but when she is able to speak to him, they will determine if UC is needed sooner. If he decides to go to UC, she will call to cancel appt. Pt has not taken covid test as they have not had any.      CHAPINCITO BERNABE

## 2024-10-30 ENCOUNTER — TELEPHONE (OUTPATIENT)
Dept: INTERNAL MEDICINE CLINIC | Facility: CLINIC | Age: 49
End: 2024-10-30

## 2024-10-30 DIAGNOSIS — Z13.0 SCREENING FOR BLOOD DISEASE: ICD-10-CM

## 2024-10-30 DIAGNOSIS — Z13.228 SCREENING FOR METABOLIC DISORDER: ICD-10-CM

## 2024-10-30 DIAGNOSIS — Z12.5 SCREENING FOR MALIGNANT NEOPLASM OF PROSTATE: ICD-10-CM

## 2024-10-30 DIAGNOSIS — Z00.00 ROUTINE GENERAL MEDICAL EXAMINATION AT A HEALTH CARE FACILITY: Primary | ICD-10-CM

## 2024-10-30 DIAGNOSIS — Z13.220 SCREENING FOR LIPID DISORDERS: ICD-10-CM

## 2024-10-30 DIAGNOSIS — Z13.29 SCREENING FOR THYROID DISORDER: ICD-10-CM

## 2024-10-30 NOTE — TELEPHONE ENCOUNTER
Patient called request labs prior to their annual physical.  Annual physical scheduled for 11/13/24.   Please order labs. Patient preferred lab is [facility].  Patient informed request was sent to clinical team.  Patient informed to fast for labs.  No callback required.

## 2024-11-04 ENCOUNTER — LAB ENCOUNTER (OUTPATIENT)
Dept: LAB | Age: 49
End: 2024-11-04
Attending: FAMILY MEDICINE
Payer: COMMERCIAL

## 2024-11-04 DIAGNOSIS — Z13.0 SCREENING FOR BLOOD DISEASE: ICD-10-CM

## 2024-11-04 DIAGNOSIS — Z13.228 SCREENING FOR METABOLIC DISORDER: ICD-10-CM

## 2024-11-04 DIAGNOSIS — Z00.00 ROUTINE GENERAL MEDICAL EXAMINATION AT A HEALTH CARE FACILITY: ICD-10-CM

## 2024-11-04 DIAGNOSIS — Z13.220 SCREENING FOR LIPID DISORDERS: ICD-10-CM

## 2024-11-04 DIAGNOSIS — Z12.5 SCREENING FOR MALIGNANT NEOPLASM OF PROSTATE: ICD-10-CM

## 2024-11-04 LAB
ALBUMIN SERPL-MCNC: 4.5 G/DL (ref 3.2–4.8)
ALBUMIN/GLOB SERPL: 1.7 {RATIO} (ref 1–2)
ALP LIVER SERPL-CCNC: 48 U/L
ALT SERPL-CCNC: 17 U/L
ANION GAP SERPL CALC-SCNC: 2 MMOL/L (ref 0–18)
AST SERPL-CCNC: 19 U/L (ref ?–34)
BASOPHILS # BLD AUTO: 0.03 X10(3) UL (ref 0–0.2)
BASOPHILS NFR BLD AUTO: 0.5 %
BILIRUB SERPL-MCNC: 0.9 MG/DL (ref 0.3–1.2)
BUN BLD-MCNC: 12 MG/DL (ref 9–23)
CALCIUM BLD-MCNC: 10.2 MG/DL (ref 8.7–10.4)
CHLORIDE SERPL-SCNC: 109 MMOL/L (ref 98–112)
CHOLEST SERPL-MCNC: 186 MG/DL (ref ?–200)
CO2 SERPL-SCNC: 30 MMOL/L (ref 21–32)
COMPLEXED PSA SERPL-MCNC: 0.66 NG/ML (ref ?–4)
CREAT BLD-MCNC: 1.01 MG/DL
EGFRCR SERPLBLD CKD-EPI 2021: 90 ML/MIN/1.73M2 (ref 60–?)
EOSINOPHIL # BLD AUTO: 0.2 X10(3) UL (ref 0–0.7)
EOSINOPHIL NFR BLD AUTO: 3.1 %
ERYTHROCYTE [DISTWIDTH] IN BLOOD BY AUTOMATED COUNT: 12.7 %
FASTING PATIENT LIPID ANSWER: YES
FASTING STATUS PATIENT QL REPORTED: YES
GLOBULIN PLAS-MCNC: 2.6 G/DL (ref 2–3.5)
GLUCOSE BLD-MCNC: 98 MG/DL (ref 70–99)
HCT VFR BLD AUTO: 45.6 %
HDLC SERPL-MCNC: 61 MG/DL (ref 40–59)
HGB BLD-MCNC: 15.5 G/DL
IMM GRANULOCYTES # BLD AUTO: 0.02 X10(3) UL (ref 0–1)
IMM GRANULOCYTES NFR BLD: 0.3 %
LDLC SERPL CALC-MCNC: 106 MG/DL (ref ?–100)
LYMPHOCYTES # BLD AUTO: 1.91 X10(3) UL (ref 1–4)
LYMPHOCYTES NFR BLD AUTO: 29.8 %
MCH RBC QN AUTO: 31.4 PG (ref 26–34)
MCHC RBC AUTO-ENTMCNC: 34 G/DL (ref 31–37)
MCV RBC AUTO: 92.3 FL
MONOCYTES # BLD AUTO: 0.5 X10(3) UL (ref 0.1–1)
MONOCYTES NFR BLD AUTO: 7.8 %
NEUTROPHILS # BLD AUTO: 3.74 X10 (3) UL (ref 1.5–7.7)
NEUTROPHILS # BLD AUTO: 3.74 X10(3) UL (ref 1.5–7.7)
NEUTROPHILS NFR BLD AUTO: 58.5 %
NONHDLC SERPL-MCNC: 125 MG/DL (ref ?–130)
OSMOLALITY SERPL CALC.SUM OF ELEC: 292 MOSM/KG (ref 275–295)
PLATELET # BLD AUTO: 197 10(3)UL (ref 150–450)
POTASSIUM SERPL-SCNC: 4.3 MMOL/L (ref 3.5–5.1)
PROT SERPL-MCNC: 7.1 G/DL (ref 5.7–8.2)
RBC # BLD AUTO: 4.94 X10(6)UL
SODIUM SERPL-SCNC: 141 MMOL/L (ref 136–145)
TRIGL SERPL-MCNC: 105 MG/DL (ref 30–149)
VLDLC SERPL CALC-MCNC: 18 MG/DL (ref 0–30)
WBC # BLD AUTO: 6.4 X10(3) UL (ref 4–11)

## 2024-11-04 PROCEDURE — 80061 LIPID PANEL: CPT | Performed by: FAMILY MEDICINE

## 2024-11-04 PROCEDURE — 85025 COMPLETE CBC W/AUTO DIFF WBC: CPT | Performed by: FAMILY MEDICINE

## 2024-11-04 PROCEDURE — 84153 ASSAY OF PSA TOTAL: CPT | Performed by: FAMILY MEDICINE

## 2024-11-04 PROCEDURE — 80053 COMPREHEN METABOLIC PANEL: CPT | Performed by: FAMILY MEDICINE

## 2024-11-13 ENCOUNTER — OFFICE VISIT (OUTPATIENT)
Dept: INTERNAL MEDICINE CLINIC | Facility: CLINIC | Age: 49
End: 2024-11-13
Payer: COMMERCIAL

## 2024-11-13 VITALS
DIASTOLIC BLOOD PRESSURE: 74 MMHG | WEIGHT: 158.31 LBS | SYSTOLIC BLOOD PRESSURE: 136 MMHG | HEART RATE: 76 BPM | TEMPERATURE: 97 F | RESPIRATION RATE: 18 BRPM | HEIGHT: 68.9 IN | OXYGEN SATURATION: 98 % | BODY MASS INDEX: 23.45 KG/M2

## 2024-11-13 DIAGNOSIS — Z00.00 ANNUAL PHYSICAL EXAM: Primary | ICD-10-CM

## 2024-11-13 DIAGNOSIS — B02.9 HERPES ZOSTER WITHOUT COMPLICATION: ICD-10-CM

## 2024-11-13 PROCEDURE — 3078F DIAST BP <80 MM HG: CPT | Performed by: FAMILY MEDICINE

## 2024-11-13 PROCEDURE — 3075F SYST BP GE 130 - 139MM HG: CPT | Performed by: FAMILY MEDICINE

## 2024-11-13 PROCEDURE — 99396 PREV VISIT EST AGE 40-64: CPT | Performed by: FAMILY MEDICINE

## 2024-11-13 PROCEDURE — 3008F BODY MASS INDEX DOCD: CPT | Performed by: FAMILY MEDICINE

## 2024-11-13 RX ORDER — VALACYCLOVIR HYDROCHLORIDE 1 G/1
1000 TABLET, FILM COATED ORAL 3 TIMES DAILY
Qty: 21 TABLET | Refills: 0 | Status: SHIPPED | OUTPATIENT
Start: 2024-11-13 | End: 2024-11-20

## 2024-11-13 NOTE — PROGRESS NOTES
Subjective:   Patient ID: Ashli Maria is a 49 year old male.    HPI Here for annual check-up. Patient is exercising on average once every couple of days.   Has had rash on lower abdomen on left side for one day. Feels similar to when he had shingles last year.     History/Other:   Past Medical History:    Abdominal pain    Antral erosion    small antral erosions    Anxiety    Atypical mole    Chest discomfort    Elevated LFTs    ETD (eustachian tube dysfunction)    Frequent urination    Heartburn    High blood pressure    Hypertrophy of inferior nasal turbinate    Irregular heart beat    \"irregular heart beats; heart premature beating 1993\" per E-Clinical    Liver lesion    nonspecific liver lesions    Malaise and fatigue    Nasal septal deviation    Stye    Wears glasses     Past Surgical History:   Procedure Laterality Date    Nasal surg proc unlisted  6/1/09    septoplasty, outfracture & laser reduction of inferior turbinates    Upper gi endoscopy,exam  12/1/2011    EGD w/ biopsies     Social History     Socioeconomic History    Marital status:    Tobacco Use    Smoking status: Never    Smokeless tobacco: Never   Vaping Use    Vaping status: Never Used   Substance and Sexual Activity    Alcohol use: Yes     Alcohol/week: 1.0 standard drink of alcohol     Types: 1 Cans of beer per week    Drug use: No   Other Topics Concern    Exercise Yes     Comment: swimming      Family History   Problem Relation Age of Onset    Heart Disorder Father         irregular heart beats    Heart Disease Father     Hypertension Mother     Diabetes Mother         Type 2    Lung Disorder Paternal Grandfather         lung disease    Cancer Maternal Grandmother         stomach    Stroke Maternal Grandfather     Hypertension Maternal Grandfather        Review of Systems   Constitutional:  Negative for chills, fatigue and fever.   HENT:  Negative for hearing loss and sore throat.    Eyes:  Negative for pain and visual disturbance.    Respiratory:  Negative for choking, shortness of breath and wheezing.    Cardiovascular:  Negative for chest pain, palpitations and leg swelling.   Gastrointestinal:  Negative for abdominal pain, constipation, diarrhea, nausea and vomiting.   Endocrine: Negative for polydipsia, polyphagia and polyuria.   Genitourinary:  Negative for dysuria.   Musculoskeletal:  Negative for arthralgias and joint swelling.   Skin:  Negative for rash.   Neurological:  Negative for dizziness, weakness, light-headedness, numbness and headaches.   Hematological:  Negative for adenopathy. Does not bruise/bleed easily.   Psychiatric/Behavioral:  Negative for dysphoric mood. The patient is not nervous/anxious.      Current Outpatient Medications   Medication Sig Dispense Refill    valACYclovir 1 G Oral Tab Take 1 tablet (1,000 mg total) by mouth in the morning, at noon, and at bedtime for 7 days. 21 tablet 0    Multiple Vitamins-Minerals (MULTIVITAMIN MEN OR) Take by mouth.      Glucos-Chond-Hyal Ac-Ca Fructo (MOVE FREE JOINT [facility] ADVANCE OR) Take by mouth.      ASPIRIN OR Take by mouth as needed.         Allergies:Allergies[1]    Objective:   Physical Exam  Vitals reviewed.   Constitutional:       Appearance: Normal appearance. He is well-developed.   HENT:      Head: Normocephalic and atraumatic.      Right Ear: Tympanic membrane, ear canal and external ear normal.      Left Ear: Tympanic membrane, ear canal and external ear normal.      Mouth/Throat:      Pharynx: No posterior oropharyngeal erythema.   Eyes:      Conjunctiva/sclera: Conjunctivae normal.   Cardiovascular:      Rate and Rhythm: Normal rate and regular rhythm.      Heart sounds: Normal heart sounds.   Pulmonary:      Effort: Pulmonary effort is normal.      Breath sounds: Normal breath sounds.   Musculoskeletal:      Cervical back: Normal range of motion and neck supple.   Skin:     General: Skin is warm and dry.             Comments: Few, scattered erythematous papules  in dermatomal distribution   Neurological:      Mental Status: He is alert and oriented to person, place, and time.   Psychiatric:         Behavior: Behavior normal.         Assessment & Plan:   1. Annual physical exam    2. Herpes zoster without complication    Reviewed age-appropriate preventive health and safety recommendations with patient. Reviewed lab results. Encouraged regular exercise and healthy eating.   Rx anti-viral. Discussed risks/benefits/potential side effects and proper use of medication. Get shingles vaccine once recovered.     No orders of the defined types were placed in this encounter.      Meds This Visit:  Requested Prescriptions     Signed Prescriptions Disp Refills    valACYclovir 1 G Oral Tab 21 tablet 0     Sig: Take 1 tablet (1,000 mg total) by mouth in the morning, at noon, and at bedtime for 7 days.       Imaging & Referrals:  None         [1] No Known Allergies

## 2024-12-23 NOTE — TELEPHONE ENCOUNTER
Future Appointments   Date Time Provider Noemi Eboni   10/5/2018  8:00 AM Eh Doan,  [facility]       For CPE.  Please send BW sent to [facility], Pt aware to fast. Pt tolerated infusion without difficulty.

## 2025-02-12 ENCOUNTER — OFFICE VISIT (OUTPATIENT)
Dept: FAMILY MEDICINE CLINIC | Facility: CLINIC | Age: 50
End: 2025-02-12
Payer: COMMERCIAL

## 2025-02-12 VITALS
WEIGHT: 166 LBS | BODY MASS INDEX: 24.59 KG/M2 | DIASTOLIC BLOOD PRESSURE: 94 MMHG | HEIGHT: 69 IN | RESPIRATION RATE: 16 BRPM | TEMPERATURE: 99 F | OXYGEN SATURATION: 96 % | HEART RATE: 91 BPM | SYSTOLIC BLOOD PRESSURE: 130 MMHG

## 2025-02-12 DIAGNOSIS — Z20.828 EXPOSURE TO INFLUENZA: Primary | ICD-10-CM

## 2025-02-12 DIAGNOSIS — J11.1 INFLUENZA-LIKE ILLNESS: ICD-10-CM

## 2025-02-12 PROCEDURE — 99213 OFFICE O/P EST LOW 20 MIN: CPT | Performed by: NURSE PRACTITIONER

## 2025-02-12 PROCEDURE — 3008F BODY MASS INDEX DOCD: CPT | Performed by: NURSE PRACTITIONER

## 2025-02-12 PROCEDURE — 3080F DIAST BP >= 90 MM HG: CPT | Performed by: NURSE PRACTITIONER

## 2025-02-12 PROCEDURE — 3075F SYST BP GE 130 - 139MM HG: CPT | Performed by: NURSE PRACTITIONER

## 2025-02-12 RX ORDER — OSELTAMIVIR PHOSPHATE 75 MG/1
75 CAPSULE ORAL 2 TIMES DAILY
Qty: 10 CAPSULE | Refills: 0 | Status: SHIPPED | OUTPATIENT
Start: 2025-02-12 | End: 2025-02-17

## 2025-02-12 NOTE — PROGRESS NOTES
CHIEF COMPLAINT:     Chief Complaint   Patient presents with    Flu     1 day, body aches, HA, feverish, mild cough, exposure to flu, OTC advil       HPI:   Ashli Maria is a 49 year old male who presents for sudden onset fever, body aches, chills and upper respiratory symptoms for  1 days. TMAX of tactile. Patient reports sore throat, dry cough. Symptoms have been worsening since onset.  Treating symptoms with otc.   Associated symptoms include fatigue aches. Child and wife seen past several days and dx with influenza.     Current Outpatient Medications   Medication Sig Dispense Refill    oseltamivir 75 MG Oral Cap Take 1 capsule (75 mg total) by mouth 2 (two) times daily for 5 days. 10 capsule 0    Multiple Vitamins-Minerals (MULTIVITAMIN MEN OR) Take by mouth.      Glucos-Chond-Hyal Ac-Ca Fructo (MOVE FREE [employer] HEALTH ADVANCE OR) Take by mouth.      ASPIRIN OR Take by mouth as needed.          Past Medical History:    Abdominal pain    Antral erosion    small antral erosions    Anxiety    Atypical mole    Chest discomfort    Elevated LFTs    ETD (eustachian tube dysfunction)    Frequent urination    Heartburn    High blood pressure    Hypertrophy of inferior nasal turbinate    Irregular heart beat    \"irregular heart beats; heart premature beating 1993\" per E-Clinical    Liver lesion    nonspecific liver lesions    Malaise and fatigue    Nasal septal deviation    Stye    Wears glasses      Past Surgical History:   Procedure Laterality Date    Nasal surg proc unlisted  6/1/09    septoplasty, outfracture & laser reduction of inferior turbinates    Upper gi endoscopy,exam  12/1/2011    EGD w/ biopsies         Social History     Socioeconomic History    Marital status:    Tobacco Use    Smoking status: Never    Smokeless tobacco: Never   Vaping Use    Vaping status: Never Used   Substance and Sexual Activity    Alcohol use: Yes     Alcohol/week: 1.0 standard drink of alcohol     Types: 1 Cans of beer per week     Drug use: No   Other Topics Concern    Exercise Yes     Comment: swimming          REVIEW OF SYSTEMS:   GENERAL: feels well otherwise,   ok appetite  SKIN: no rashes or abnormal skin lesions  HEENT: See HPI  LUNGS: denies shortness of breath or wheezing, See HPI  CARDIOVASCULAR: denies chest pain or palpitations   GI: denies N/V/C or abdominal pain  NEURO: Denies headaches    EXAM:   BP (!) 130/94   Pulse 91   Temp 98.5 °F (36.9 °C)   Resp 16   Ht 5' 9\" (1.753 m)   Wt 166 lb (75.3 kg)   SpO2 96%   BMI 24.51 kg/m²   GENERAL: well developed, well nourished,in no apparent distress  SKIN: no rashes,no suspicious lesions  HEAD: atraumatic, normocephalic.  ok tenderness on palpation of maxillary or frontal sinuses  EYES: conjunctiva clear, EOM intact  EARS: TM's pearly, no  bulging, no retraction,no  fluid, bony landmarks visualized  NOSE: Nostrils patent, clear nasal discharge, nasal mucosa pink and moist  THROAT: Oral mucosa pink, moist. Posterior pharynx is mildly erythematous. no exudates. Tonsils  1/4.    NECK: Supple, non-tender  LUNGS: clear to auscultation bilaterally, no wheezes or rhonchi. Breathing is non labored.  CARDIO: RRR without murmur  EXTREMITIES: no cyanosis, clubbing or edema  LYMPH:  NO cervical lymphadenopathy.        ASSESSMENT AND PLAN:   Ashli Maria is a 49 year old male who presents with     ASSESSMENT:   Encounter Diagnoses   Name Primary?    Exposure to influenza Yes    Influenza-like illness        PLAN: Discussed risk vs. Benefit of Tamiflu, would like to take.  Meds as below.  Comfort care as described in Patient Instructions.  Plenty of fluids/rest. Cont. Tyelnol/motrin for body aches and fevers.  Follow up with PCP in 3-5 days if symptoms not improving. Any SOB/Difficulty breathing/wheezing, seek, emergent care.    Meds & Refills for this Visit:  Requested Prescriptions     Signed Prescriptions Disp Refills    oseltamivir 75 MG Oral Cap 10 capsule 0     Sig: Take 1 capsule (75 mg  total) by mouth 2 (two) times daily for 5 days.       Risks, benefits, and side effects of medication explained and discussed.    There are no Patient Instructions on file for this visit.    The patient indicates understanding of these issues and agrees to the plan.  The patient is asked to return if sx's persist or worsen.